# Patient Record
Sex: MALE | Race: WHITE | NOT HISPANIC OR LATINO | Employment: UNEMPLOYED | URBAN - METROPOLITAN AREA
[De-identification: names, ages, dates, MRNs, and addresses within clinical notes are randomized per-mention and may not be internally consistent; named-entity substitution may affect disease eponyms.]

---

## 2023-04-17 PROBLEM — M72.0 DUPUYTREN'S CONTRACTURE OF BOTH HANDS: Status: ACTIVE | Noted: 2023-04-17

## 2023-04-17 PROBLEM — R79.89 LOW TESTOSTERONE: Status: ACTIVE | Noted: 2023-04-17

## 2023-04-17 PROBLEM — R79.89 LOW TESTOSTERONE IN MALE: Status: ACTIVE | Noted: 2023-04-17

## 2023-04-17 PROBLEM — R71.8 ELEVATED FERRITIN, HEMOGLOBIN, AND RED BLOOD CELL COUNT (HCC): Status: ACTIVE | Noted: 2023-04-17

## 2023-04-17 PROBLEM — D58.2 ELEVATED FERRITIN, HEMOGLOBIN, AND RED BLOOD CELL COUNT (HCC): Status: ACTIVE | Noted: 2023-04-17

## 2023-04-17 PROBLEM — E78.2 MIXED HYPERLIPIDEMIA: Status: ACTIVE | Noted: 2023-04-17

## 2023-05-11 NOTE — PROGRESS NOTES
[address] [facility]  [facility]  [address] [facility] [address]  [place], [address] [facility]  [phone]  [email or web address]      NAME: Asiya Reagan  AGE: 65 y o  SEX: male  : 1960   MRN: [id number]    DATE: 2023  TIME: 8:37 AM    Assessment and Plan:    Hypogonadism: Manifested by fatigue low libido and decreased memory  We will restart this but first we will get baseline labs of testosterone free and total and a recent CBC showed a hemoglobin of 15 9  Instead of testosterone cypionate for now we will start him on daily AndroGel and then check repeat labs in 6 weeks and send him the results  We will let him know the results of the repeat labs  ED: Prescriptions refilled for sildenafil 100 mg     Reassess in the office in 3 months  Chief Complaint     Chief Complaint   Patient presents with   • Low Testosterone in Male       History of Present Illness   New patient office visit-65year-old man referred for hypogonadism/low testosterone  PSA 1   However I have no testosterone levels on file  Has been on testosterone cypionate replacement therapy 200 mg vial 0 75 cc every 16 days for couple of years in [place]  Last time he received any was 3 months ago  Last testosterone was in February 8 days after his last injection which was 693  This all started a couple of years ago when he was having afternoon fatigue where he felt wiped out and also he was experiencing some memory loss  He saw neurology and had a number of tests run and found to have a consistently low testosterone which I do not know the exact value  In any event he was placed on the testosterone replacement and this was successful  He now is experiencing the afternoon fatigue that he was having  No voiding difficulties  No family history of prostate cancer  ED: Needs sildenafil to be able to perform for penetration  This is successful at 100 mg        The following portions of the patient's history were reviewed and updated as appropriate: allergies, current medications, past family history, past medical history, past social history, past surgical history and problem list   No past medical history on file  No past surgical history on file  shoulder  Review of Systems   Review of Systems   Constitutional: Positive for fatigue  Negative for fever  Respiratory: Negative for shortness of breath  Cardiovascular: Negative for chest pain  Genitourinary: Negative  Active Problem List     Patient Active Problem List   Diagnosis   • Gastroesophageal reflux disease without esophagitis   • Other male erectile dysfunction   • Elevated ferritin, hemoglobin, and red blood cell count (HCC)   • Mixed hyperlipidemia   • Dupuytren's contracture of both hands   • Low testosterone in male       Objective   /74 (BP Location: Left arm, Patient Position: Sitting, Cuff Size: Large)   Pulse 84   Ht 6' (1 829 m)   Wt 92 1 kg (203 lb)   SpO2 100%   BMI 27 53 kg/m²     Physical Exam  Vitals reviewed  Constitutional:       Appearance: Normal appearance  He is normal weight  HENT:      Head: Normocephalic and atraumatic  Eyes:      Extraocular Movements: Extraocular movements intact  Abdominal:      General: There is no distension  Palpations: Abdomen is soft  There is no mass  Tenderness: There is no abdominal tenderness  There is no right CVA tenderness, left CVA tenderness, guarding or rebound  Hernia: No hernia is present  Genitourinary:     Penis: Normal        Testes: Normal       Prostate: Normal       Rectum: Normal       Comments: Normal circumcised phallus, testicles are normal without masses normal volume  Rectal exam feels normal tone no masses and his prostate is about 20 to 30 g, smooth symmetric and benign  No nodules  Musculoskeletal:         General: Normal range of motion  Cervical back: Normal range of motion     Skin: Coloration: Skin is not jaundiced or pale  Neurological:      General: No focal deficit present  Mental Status: He is alert and oriented to person, place, and time  Psychiatric:         Mood and Affect: Mood normal          Behavior: Behavior normal          Thought Content:  Thought content normal          Judgment: Judgment normal              Current Medications     Current Outpatient Medications:   •  B Complex-C (Vitamin B + C Complex) TABS, Take by mouth, Disp: , Rfl:   •  Coenzyme Q10 (Co Q-10 Maximum Strength) 400 MG CAPS, Take by mouth, Disp: , Rfl:   •  Omega-3 1000 MG CAPS, Take 1 capsule (1,000 mg total) by mouth 2 (two) times a day, Disp: 60 capsule, Rfl: 3  •  omeprazole (PriLOSEC) 20 mg delayed release capsule, Take 1 capsule (20 mg total) by mouth daily, Disp: 90 capsule, Rfl: 0  •  rosuvastatin (CRESTOR) 5 mg tablet, Take 1 tablet (5 mg total) by mouth daily, Disp: 30 tablet, Rfl: 1  •  sildenafil (VIAGRA) 100 mg tablet, Take 100 mg by mouth daily as needed for erectile dysfunction, Disp: , Rfl:   •  testosterone cypionate (DEPO-TESTOSTERONE) 200 mg/mL SOLN, , Disp: , Rfl:   •  omega-3-acid ethyl esters (LOVAZA) 1 g capsule, Take 2 g by mouth 2 (two) times a day, Disp: , Rfl:   •  Testosterone Cypionate 200 MG/ML SOLN, , Disp: , Rfl:         Chula Mosquera MD

## 2023-05-12 ENCOUNTER — TELEPHONE (OUTPATIENT)
Dept: OTHER | Facility: OTHER | Age: 63
End: 2023-05-12

## 2023-05-12 ENCOUNTER — CONSULT (OUTPATIENT)
Dept: UROLOGY | Facility: CLINIC | Age: 63
End: 2023-05-12

## 2023-05-12 ENCOUNTER — TELEPHONE (OUTPATIENT)
Dept: UROLOGY | Facility: CLINIC | Age: 63
End: 2023-05-12

## 2023-05-12 VITALS
OXYGEN SATURATION: 100 % | BODY MASS INDEX: 27.5 KG/M2 | WEIGHT: 203 LBS | DIASTOLIC BLOOD PRESSURE: 74 MMHG | HEART RATE: 84 BPM | HEIGHT: 72 IN | SYSTOLIC BLOOD PRESSURE: 124 MMHG

## 2023-05-12 DIAGNOSIS — N52.8 OTHER MALE ERECTILE DYSFUNCTION: ICD-10-CM

## 2023-05-12 DIAGNOSIS — R79.89 LOW TESTOSTERONE: ICD-10-CM

## 2023-05-12 RX ORDER — TESTOSTERONE GEL, 1% 10 MG/G
50 GEL TRANSDERMAL DAILY
Qty: 30 PACKET | Refills: 3 | Status: SHIPPED | OUTPATIENT
Start: 2023-05-12 | End: 2023-05-18

## 2023-05-12 RX ORDER — B-COMPLEX WITH VITAMIN C
TABLET ORAL
COMMUNITY

## 2023-05-12 RX ORDER — OMEGA-3-ACID ETHYL ESTERS 1 G/1
2 CAPSULE, LIQUID FILLED ORAL 2 TIMES DAILY
COMMUNITY
Start: 2023-02-22

## 2023-05-12 RX ORDER — UBIDECARENONE 400 MG
CAPSULE ORAL
COMMUNITY

## 2023-05-12 RX ORDER — SILDENAFIL 100 MG/1
100 TABLET, FILM COATED ORAL DAILY PRN
Qty: 10 TABLET | Refills: 11 | Status: SHIPPED | OUTPATIENT
Start: 2023-05-12

## 2023-05-12 RX ORDER — TESTOSTERONE CYPIONATE 200 MG/ML
INJECTION, SOLUTION INTRAMUSCULAR
COMMUNITY
Start: 2023-02-22 | End: 2023-05-18 | Stop reason: SDUPTHER

## 2023-05-12 NOTE — TELEPHONE ENCOUNTER
Patient needs a 3 month ov follow up with Dr Sangeeta Muñiz  Schedule not open here in [place]  Please call pt and get him set up for an appt  TIA

## 2023-05-12 NOTE — TELEPHONE ENCOUNTER
Renetta from [facility] called in stating pt's testosterone prescription will require a prior auth

## 2023-05-12 NOTE — PATIENT INSTRUCTIONS
Have the blood work done, then start the AndroGel  Have the blood work done in the morning  After being on the AndroGel for 6 weeks, on or about June 23 to have repeat blood work done in the morning  I will let you know those results  If AndroGel does not work out I will be happy to switch you back to testosterone cypionate-I usually use 100 mg weekly

## 2023-05-16 ENCOUNTER — PATIENT MESSAGE (OUTPATIENT)
Dept: UROLOGY | Facility: CLINIC | Age: 63
End: 2023-05-16

## 2023-05-16 DIAGNOSIS — R79.89 LOW TESTOSTERONE: Primary | ICD-10-CM

## 2023-05-17 ENCOUNTER — APPOINTMENT (OUTPATIENT)
Dept: LAB | Facility: CLINIC | Age: 63
End: 2023-05-17

## 2023-05-17 DIAGNOSIS — R79.89 LOW TESTOSTERONE: ICD-10-CM

## 2023-05-17 LAB
ERYTHROCYTE [DISTWIDTH] IN BLOOD BY AUTOMATED COUNT: 14.5 % (ref 11.6–15.1)
ESTRADIOL SERPL-MCNC: 28.7 PG/ML
HCT VFR BLD AUTO: 48.5 % (ref 36.5–49.3)
HGB BLD-MCNC: 15.9 G/DL (ref 12–17)
MCH RBC QN AUTO: 28.3 PG (ref 26.8–34.3)
MCHC RBC AUTO-ENTMCNC: 32.8 G/DL (ref 31.4–37.4)
MCV RBC AUTO: 87 FL (ref 82–98)
PLATELET # BLD AUTO: 223 THOUSANDS/UL (ref 149–390)
PMV BLD AUTO: 10 FL (ref 8.9–12.7)
RBC # BLD AUTO: 5.61 MILLION/UL (ref 3.88–5.62)
WBC # BLD AUTO: 4.11 THOUSAND/UL (ref 4.31–10.16)

## 2023-05-18 LAB
TESTOST FREE SERPL-MCNC: 8.1 PG/ML (ref 6.6–18.1)
TESTOST SERPL-MCNC: 458 NG/DL (ref 264–916)

## 2023-05-18 RX ORDER — TESTOSTERONE CYPIONATE 200 MG/ML
100 INJECTION, SOLUTION INTRAMUSCULAR WEEKLY
Qty: 10 ML | Refills: 3 | Status: SHIPPED | OUTPATIENT
Start: 2023-05-18

## 2023-05-18 NOTE — TELEPHONE ENCOUNTER
Daria Cohen from [address] [place] [address] called in stating pt's prescription for testosterone cypionate (DEPO-TESTOSTERONE) 200 mg/mL SOLN [[id number]] requires a prior auth

## 2023-05-19 ENCOUNTER — PATIENT MESSAGE (OUTPATIENT)
Dept: UROLOGY | Facility: CLINIC | Age: 63
End: 2023-05-19

## 2023-05-19 ENCOUNTER — OFFICE VISIT (OUTPATIENT)
Dept: GASTROENTEROLOGY | Facility: CLINIC | Age: 63
End: 2023-05-19

## 2023-05-19 VITALS
OXYGEN SATURATION: 98 % | SYSTOLIC BLOOD PRESSURE: 120 MMHG | HEART RATE: 64 BPM | DIASTOLIC BLOOD PRESSURE: 80 MMHG | HEIGHT: 72 IN | WEIGHT: 209 LBS | BODY MASS INDEX: 28.31 KG/M2

## 2023-05-19 DIAGNOSIS — Z14.8 HEMOCHROMATOSIS CARRIER: ICD-10-CM

## 2023-05-19 DIAGNOSIS — R79.89 LOW TESTOSTERONE IN MALE: Primary | ICD-10-CM

## 2023-05-19 NOTE — LETTER
May 19, 2023     Micaela Espana LINDSAYRA  [address] [address]    Patient: Acacia Wilson   YOB: 1960   Date of Visit: 5/19/2023       Dear Dr Joanna Farrell: Thank you for referring Acacia Wilson to me for evaluation  Below are my notes for this consultation  If you have questions, please do not hesitate to call me  I look forward to following your patient along with you  Sincerely,        Aden Peguero MD        CC: No Recipients  Aden Peguero MD  5/19/2023  9:58 AM  Incomplete  [facility]    Dear Pérez Brantley,    I had the pleasure of seeing your patient Acacia Wilson in the office today and I thank you for this kind referral        Chief Complaint: Possible hemochromatosis      HPI:  Acacia Wilson is a 59 y o  male who presents with a history of possible hemochromatosis  According to the patient some years ago while in [place] he went for a blood test and was found to have elevated hemoglobin and hematocrit  According the patient he never underwent any other testing such as ferritin or any contrast testing such as MRI or CT  He never had a liver biopsy  He was told that his counts were too high and to go for regular blood donation  He has been going every 56 days  However the patient also has been taking testosterone for quite some time  He underwent a hereditary hemochromatosis genetic study on April 20 and was found to be heterozygous for H63D  His last serum ferritin was 15  His liver functions done at that time were normal   He is up-to-date with his colonoscopy  He has never had any known liver disease  As stated he has been taking testosterone for quite some time  He does not smoke  He does not live at elevated altitudes  His most recent blood work has his hemoglobin and hematocrit within normal range  There is no family history of liver disease         Review of Systems:   Constitutional: No fever or chills, feels well, no tiredness, no recent weight gain or weight loss  HENT: No complaints of earache, no hearing loss, no nosebleeds, no nasal discharge, no sore throat, no hoarseness  Eyes: No complaints of eye pain, no red eyes, no discharge from eyes, no itchy eyes  Cardiovascular: No complaints of slow heart rate, no fast heart rate, no chest pain, no palpitations, no leg claudication, no lower extremity edema  Respiratory: No complaints of shortness of breath, no wheezing, no cough, no SOB on exertion, no orthopnea  Gastrointestinal: As noted in HPI  Genitourinary: No complaints of dysuria, no incontinence, no hesitancy, no nocturia  Musculoskeletal: No complaints of arthralgia, no myalgias, no joint swelling or stiffness, no limb pain or swelling  Neurological: No complaints of headache, no confusion, no convulsions, no numbness or tingling, no dizziness or fainting, no limb weakness, no difficulty walking  Skin: No complaints of skin rash or skin lesions, no itching, no skin wound, no dry skin  Hematological/Lymphatic: No complaints of swollen glands, does not bleed easy  Allergic/Immunologic: No immunocompromised state  Endocrine:  No complaints of polyuria, no polydipsia  Psychiatric/Behavioral: is not suicidal, no sleep disturbances, no anxiety or depression, no change in personality, no emotional problems         Historical Information   Past Medical History:   Diagnosis Date   • GERD (gastroesophageal reflux disease)    • Hyperlipidemia      Past Surgical History:   Procedure Laterality Date   • TONSILLECTOMY       Social History   Social History     Substance and Sexual Activity   Alcohol Use Yes   • Alcohol/week: 1 0 standard drink   • Types: 1 Cans of beer per week    Comment: Occasional     Social History     Substance and Sexual Activity   Drug Use Never     Social History     Tobacco Use   Smoking Status Never   Smokeless Tobacco Never     Family History   Problem Relation Age of Onset   • Heart disease Mother          Current Medications: has a current medication list which includes the following prescription(s): vitamin b + c complex, co q-10 maximum strength, omega-3, omega-3-acid ethyl esters, omeprazole, rosuvastatin, sildenafil, and testosterone cypionate  Vital Signs: /80   Pulse 64   Ht 6' (1 829 m)   Wt 94 8 kg (209 lb)   SpO2 98%   BMI 28 35 kg/m²     Physical Exam:   Constitutional  General Appearance: No acute distress, well appearing and well nourished  Head  Normocephalic  Eyes  Conjunctivae and lids: No swelling, erythema, or discharge  Pupils and irises: Equal, round and reactive to light  Ears, Nose, Mouth, and Throat  External inspection of ears and nose: Normal  Nasal mucosa, septum and turbinates: Normal without edema or erythema/   Oropharynx: Normal with no erythema, edema, exudate or lesions  Neck  Normal range of motion  Neck supple  Cardiovascular  Auscultation of the heart: Normal rate and rhythm, normal S1 and S2 without murmurs  Examination of the extremities for edema and/or varicosities: Normal  Pulmonary/Chest  Respiratory effort: No increased work of breathing or signs of respiratory distress  Auscultation of lungs: Clear to auscultation, equal breath sounds bilaterally, no wheezes, rales, no rhonchi  Abdomen  Abdomen: Non-tender, no masses  Liver and spleen: No hepatomegaly or splenomegaly  Musculoskeletal  Gait and station: normal   Digits and Nails: normal without clubbing or cyanosis  Inspection/palpation of joints, bones, and muscles: Normal  Neurological  No nystagmus or asterixis  Skin  Skin and subcutaneous tissue: Normal without rashes or lesions  Lymphatic  Palpation of the lymph nodes in neck: No lymphadenopathy     Psychiatric  Orientation to person, place and time: Normal   Mood and affect: Normal          Labs:   Lab Results   Component Value Date    ALT 16 04/20/2023    AST 16 04/20/2023    BUN 20 04/20/2023 CALCIUM 9 1 04/20/2023     04/20/2023    CO2 26 04/20/2023    CREATININE 1 30 04/20/2023    HCT 48 5 05/17/2023    HGB 15 9 05/17/2023     05/17/2023    K 4 5 04/20/2023    PSA 1 6 04/20/2023    WBC 4 11 (L) 05/17/2023         X-Rays & Procedures:   No orders to display         ______________________________________________________________________      Assessment & Plan:      Diagnoses and all orders for this visit:    Hemochromatosis carrier  -     Ambulatory Referral to Gastroenterology      I see no compelling evidence for hemochromatosis  Heterozygous H63D is not pathognomonic for that disease  Homozygous C2 80 Y would be  I believe he just has elevated hemoglobin and hematocrit likely based on his use of testosterone which on average will raise hemoglobin 1 1 g and hematocrit 4 4  I would just recommend following his liver functions on a yearly basis  I would like to thank you for allowing me to participate in his care                With warmest regards,    Claudine Tello MD, [facility]

## 2023-05-19 NOTE — PROGRESS NOTES
[facility]    Dear Hua Cowan,    I had the pleasure of seeing your patient Artemio Rosales in the office today and I thank you for this kind referral        Chief Complaint: Possible hemochromatosis      HPI:  Artemio Rosales is a 59 y o  male who presents with a history of possible hemochromatosis  According to the patient some years ago while in [place] he went for a blood test and was found to have elevated hemoglobin and hematocrit  According the patient he never underwent any other testing such as ferritin or any contrast testing such as MRI or CT  He never had a liver biopsy  He was told that his counts were too high and to go for regular blood donation  He has been going every 56 days  However the patient also has been taking testosterone for quite some time  He underwent a hereditary hemochromatosis genetic study on April 20 and was found to be heterozygous for H63D  His last serum ferritin was 15  His liver functions done at that time were normal   He is up-to-date with his colonoscopy  He has never had any known liver disease  As stated he has been taking testosterone for quite some time  He does not smoke  He does not live at elevated altitudes  His most recent blood work has his hemoglobin and hematocrit within normal range  There is no family history of liver disease         Review of Systems:   Constitutional: No fever or chills, feels well, no tiredness, no recent weight gain or weight loss  HENT: No complaints of earache, no hearing loss, no nosebleeds, no nasal discharge, no sore throat, no hoarseness  Eyes: No complaints of eye pain, no red eyes, no discharge from eyes, no itchy eyes  Cardiovascular: No complaints of slow heart rate, no fast heart rate, no chest pain, no palpitations, no leg claudication, no lower extremity edema  Respiratory: No complaints of shortness of breath, no wheezing, no cough, no SOB on exertion, no orthopnea     Gastrointestinal: As noted in HPI  Genitourinary: No complaints of dysuria, no incontinence, no hesitancy, no nocturia  Musculoskeletal: No complaints of arthralgia, no myalgias, no joint swelling or stiffness, no limb pain or swelling  Neurological: No complaints of headache, no confusion, no convulsions, no numbness or tingling, no dizziness or fainting, no limb weakness, no difficulty walking  Skin: No complaints of skin rash or skin lesions, no itching, no skin wound, no dry skin  Hematological/Lymphatic: No complaints of swollen glands, does not bleed easy  Allergic/Immunologic: No immunocompromised state  Endocrine:  No complaints of polyuria, no polydipsia  Psychiatric/Behavioral: is not suicidal, no sleep disturbances, no anxiety or depression, no change in personality, no emotional problems  Historical Information   Past Medical History:   Diagnosis Date   • GERD (gastroesophageal reflux disease)    • Hyperlipidemia      Past Surgical History:   Procedure Laterality Date   • TONSILLECTOMY       Social History   Social History     Substance and Sexual Activity   Alcohol Use Yes   • Alcohol/week: 1 0 standard drink   • Types: 1 Cans of beer per week    Comment: Occasional     Social History     Substance and Sexual Activity   Drug Use Never     Social History     Tobacco Use   Smoking Status Never   Smokeless Tobacco Never     Family History   Problem Relation Age of Onset   • Heart disease Mother          Current Medications: has a current medication list which includes the following prescription(s): vitamin b + c complex, co q-10 maximum strength, omega-3, omega-3-acid ethyl esters, omeprazole, rosuvastatin, sildenafil, and testosterone cypionate         Vital Signs: /80   Pulse 64   Ht 6' (1 829 m)   Wt 94 8 kg (209 lb)   SpO2 98%   BMI 28 35 kg/m²     Physical Exam:   Constitutional  General Appearance: No acute distress, well appearing and well nourished  Head  Normocephalic  Eyes  Conjunctivae and lids: No swelling, erythema, or discharge  Pupils and irises: Equal, round and reactive to light  Ears, Nose, Mouth, and Throat  External inspection of ears and nose: Normal  Nasal mucosa, septum and turbinates: Normal without edema or erythema/   Oropharynx: Normal with no erythema, edema, exudate or lesions  Neck  Normal range of motion  Neck supple  Cardiovascular  Auscultation of the heart: Normal rate and rhythm, normal S1 and S2 without murmurs  Examination of the extremities for edema and/or varicosities: Normal  Pulmonary/Chest  Respiratory effort: No increased work of breathing or signs of respiratory distress  Auscultation of lungs: Clear to auscultation, equal breath sounds bilaterally, no wheezes, rales, no rhonchi  Abdomen  Abdomen: Non-tender, no masses  Liver and spleen: No hepatomegaly or splenomegaly  Musculoskeletal  Gait and station: normal   Digits and Nails: normal without clubbing or cyanosis  Inspection/palpation of joints, bones, and muscles: Normal  Neurological  No nystagmus or asterixis  Skin  Skin and subcutaneous tissue: Normal without rashes or lesions  Lymphatic  Palpation of the lymph nodes in neck: No lymphadenopathy     Psychiatric  Orientation to person, place and time: Normal   Mood and affect: Normal          Labs:   Lab Results   Component Value Date    ALT 16 04/20/2023    AST 16 04/20/2023    BUN 20 04/20/2023    CALCIUM 9 1 04/20/2023     04/20/2023    CO2 26 04/20/2023    CREATININE 1 30 04/20/2023    HCT 48 5 05/17/2023    HGB 15 9 05/17/2023     05/17/2023    K 4 5 04/20/2023    PSA 1 6 04/20/2023    WBC 4 11 (L) 05/17/2023         X-Rays & Procedures:   No orders to display         ______________________________________________________________________      Assessment & Plan:      Diagnoses and all orders for this visit:    Hemochromatosis carrier  -     Ambulatory Referral to Gastroenterology      I see no compelling evidence for hemochromatosis  Heterozygous H63D is not pathognomonic for that disease  Homozygous C2 80 Y would be  I believe he just has elevated hemoglobin and hematocrit likely based on his use of testosterone which on average will raise hemoglobin 1 1 g and hematocrit 4 4  I would just recommend following his liver functions on a yearly basis  I would like to thank you for allowing me to participate in his care                With warmest regards,    Nelia Marti MD, [facility]

## 2023-05-19 NOTE — TELEPHONE ENCOUNTER
Patient calling to check on status of prior auth for testosterone cypionate    Patient requesting a call back at [phone]

## 2023-06-06 ENCOUNTER — TELEPHONE (OUTPATIENT)
Dept: OTHER | Facility: OTHER | Age: 63
End: 2023-06-06

## 2023-06-06 DIAGNOSIS — R79.89 LOW TESTOSTERONE IN MALE: Primary | ICD-10-CM

## 2023-06-06 NOTE — TELEPHONE ENCOUNTER
Patient is calling to set a follow up after starting testosterone, Wednesdays preferred, and needs to know if a pre-auth was completed for it    He is also supposed to have blood work completed by June 23rd and needs orders

## 2023-06-06 NOTE — TELEPHONE ENCOUNTER
Called and spoke with patient  Placed orders for labwork to be completed 6 weeks after he started the injections  Informed that Dr Lydia Ybarra schedule is not open yet for Mondays in [place]  Advised I will postpone to follow up  He also wanted to know if testosterone and the syringes were authorized yet  He said he last purchased them out of pocket so he didn't have to wait   Informed I will send high priority to auth specialist

## 2023-06-16 NOTE — TELEPHONE ENCOUNTER
Called and spoke with patient  According to last office visit note from May, patient is to follow up in 3 months  Patient has September follow up, asked if he wanted to move appt up  Patient states his wife is having a surgery in August so he wishes to keep appt as is  Informed that messages were sent to medication auth specialist and will reach out again next week if we don't hear back

## 2023-06-18 DIAGNOSIS — E78.2 MIXED HYPERLIPIDEMIA: ICD-10-CM

## 2023-06-19 ENCOUNTER — OFFICE VISIT (OUTPATIENT)
Dept: OBGYN CLINIC | Facility: MEDICAL CENTER | Age: 63
End: 2023-06-19
Payer: COMMERCIAL

## 2023-06-19 VITALS
DIASTOLIC BLOOD PRESSURE: 86 MMHG | HEIGHT: 72 IN | WEIGHT: 209 LBS | HEART RATE: 84 BPM | BODY MASS INDEX: 28.31 KG/M2 | SYSTOLIC BLOOD PRESSURE: 122 MMHG

## 2023-06-19 DIAGNOSIS — M72.0 DUPUYTREN'S CONTRACTURE OF BOTH HANDS: ICD-10-CM

## 2023-06-19 PROCEDURE — 99202 OFFICE O/P NEW SF 15 MIN: CPT | Performed by: SURGERY

## 2023-06-19 RX ORDER — ROSUVASTATIN CALCIUM 5 MG/1
5 TABLET, COATED ORAL DAILY
Qty: 90 TABLET | Refills: 0 | Status: SHIPPED | OUTPATIENT
Start: 2023-06-19

## 2023-06-19 NOTE — PROGRESS NOTES
ORTHOPAEDIC HAND, WRIST, AND ELBOW OFFICE  VISIT       ASSESSMENT/PLAN:      59 y o male who presents with Dupuytren's disease of B/L hands L>R    Physical exam preformed  We discussed when surgery is warranted when there is a contracture of his digits  His symptoms may not worsen for some time  Pt will monitor symptoms  The patient verbalized understanding of exam findings and treatment plan  We engaged in the shared decision-making process and treatment options were discussed at length with the patient  Surgical and conservative management discussed today along with risks and benefits  Diagnoses and all orders for this visit:    Dupuytren's contracture of both hands  -     Ambulatory Referral to Orthopedic Surgery        Follow Up:  Return if symptoms worsen or fail to improve  General Discussions:  Dupuytren's Disease: The anatomy and physiology of Dupuytren's disease were discussed with the patient today in the office  Increased collagen formation (similar to scar tissue formation but without injury) within the interval between the skin volarly and the flexor tendons dorsally can result in pit formation, nodular formation, or eventual cord formation  These pathologic cords can cause contracture at either the metacarpophalangeal joint, proximal interphalangeal joint, or both  As the cords progress towards the proximal interphalangeal joint, the neurovascular structures of the finger may become involved within the disease process  While this is a genetic condition with variable penetrance, repetitive micro trauma may trigger the development of cord formation and thus contracture  Conservative treatment options including therapy to maintain joint mobility and tabletop testing were discussed  Other treatments include Xiaflex (collagenase) injection and surgical excision of abnormal cords  "          ____________________________________________________________________________________________________________________________________________      CHIEF COMPLAINT:  Chief Complaint   Patient presents with   • Left Hand - Pain   • Right Hand - Pain       SUBJECTIVE:  Claudia Wilder is a 59y o  year old  male who presents for evaluation of B/L contractures   Pt states he has has skin contracture and masses in his Left hand for approx 30-40 yrs now  He has just noticed these are starting in his right hand  He denies pain and they do not affect his function  He was his PCP who referred him to have it looked at  No past treatments  No injury or traumas      I have personally reviewed all the relevant PMH, PSH, SH, FH, Medications and allergies      PAST MEDICAL HISTORY:  Past Medical History:   Diagnosis Date   • GERD (gastroesophageal reflux disease)    • Hyperlipidemia        PAST SURGICAL HISTORY:  Past Surgical History:   Procedure Laterality Date   • TONSILLECTOMY         FAMILY HISTORY:  Family History   Problem Relation Age of Onset   • Heart disease Mother        SOCIAL HISTORY:  Social History     Tobacco Use   • Smoking status: Never   • Smokeless tobacco: Never   Vaping Use   • Vaping Use: Never used   Substance Use Topics   • Alcohol use:  Yes     Alcohol/week: 1 0 standard drink of alcohol     Types: 1 Cans of beer per week     Comment: Occasional   • Drug use: Never       MEDICATIONS:    Current Outpatient Medications:   •  B Complex-C (Vitamin B + C Complex) TABS, Take by mouth, Disp: , Rfl:   •  Coenzyme Q10 (Co Q-10 Maximum Strength) 400 MG CAPS, Take by mouth, Disp: , Rfl:   •  NEEDLE, DISP, 18 G 18G X 1-1/2\" MISC, Use once a week, Disp: 10 each, Rfl: 6  •  Needles & Syringes MISC, Use once a week, Disp: 10 each, Rfl: 6  •  Omega-3 1000 MG CAPS, Take 1 capsule (1,000 mg total) by mouth 2 (two) times a day, Disp: 60 capsule, Rfl: 3  •  omega-3-acid ethyl esters (LOVAZA) 1 g capsule, Take 2 g by " "mouth 2 (two) times a day, Disp: , Rfl:   •  omeprazole (PriLOSEC) 20 mg delayed release capsule, Take 1 capsule (20 mg total) by mouth daily, Disp: 90 capsule, Rfl: 0  •  rosuvastatin (CRESTOR) 5 mg tablet, Take 1 tablet (5 mg total) by mouth daily, Disp: 90 tablet, Rfl: 0  •  sildenafil (VIAGRA) 100 mg tablet, Take 1 tablet (100 mg total) by mouth daily as needed for erectile dysfunction, Disp: 10 tablet, Rfl: 11  •  SYRINGE-NEEDLE, DISP, 3 ML (Luer Lock Safety Syringes) 22G X 1\" 3 ML MISC, Use once a week, Disp: 10 each, Rfl: 6  •  testosterone cypionate (DEPO-TESTOSTERONE) 200 mg/mL SOLN, Inject 0 5 mL (100 mg total) into a muscle once a week, Disp: 10 mL, Rfl: 3    ALLERGIES:  No Known Allergies        REVIEW OF SYSTEMS:  Review of Systems   Constitutional: Negative for chills and fever  HENT: Negative for ear pain and sore throat  Eyes: Negative for pain and visual disturbance  Respiratory: Negative for cough and shortness of breath  Cardiovascular: Negative for chest pain and palpitations  Gastrointestinal: Negative for abdominal pain and vomiting  Genitourinary: Negative for dysuria and hematuria  Musculoskeletal: Negative for arthralgias and back pain  Skin: Negative for color change and rash  Neurological: Negative for seizures and syncope  All other systems reviewed and are negative        VITALS:  Vitals:    06/19/23 1400   BP: 122/86   Pulse: 84       LABS:  HgA1c: No results found for: \"HGBA1C\"  BMP:   Lab Results   Component Value Date    CALCIUM 9 1 04/20/2023    K 4 5 04/20/2023    CO2 26 04/20/2023     04/20/2023    BUN 20 04/20/2023    CREATININE 1 30 04/20/2023       _____________________________________________________  PHYSICAL EXAMINATION:  General: well developed and well nourished, alert, oriented times 3 and appears comfortable  Psychiatric: Normal  HEENT: Normocephalic, Atraumatic Trachea Midline, No torticollis  Pulmonary: No audible wheezing or respiratory " distress   Abdomen/GI: Non tender, non distended   Cardiovascular: No pitting edema, 2+ radial pulse   Skin: No masses, erythema, lacerations, fluctation, ulcerations  Neurovascular: Sensation Intact to the Median, Ulnar, Radial Nerve, Motor Intact to the Median, Ulnar, Radial Nerve and Pulses Intact  Musculoskeletal: Normal, except as noted in detailed exam and in HPI        MUSCULOSKELETAL EXAMINATION:  SILT  Composite fist    Cord contracture noted:   Left palmar ring and small fingers    Right Ring finger  ___________________________________________________  STUDIES REVIEWED:  No images noted           PROCEDURES PERFORMED:  Procedures  No Procedures performed today    _____________________________________________________      Anjali Angelo    I,:  Jeremías Dailey am acting as a scribe while in the presence of the attending physician :       I,:  Joanna Ruiz MD personally performed the services described in this documentation    as scribed in my presence :

## 2023-06-21 ENCOUNTER — CONSULT (OUTPATIENT)
Dept: PULMONOLOGY | Facility: CLINIC | Age: 63
End: 2023-06-21
Payer: COMMERCIAL

## 2023-06-21 VITALS
DIASTOLIC BLOOD PRESSURE: 82 MMHG | WEIGHT: 209 LBS | BODY MASS INDEX: 28.35 KG/M2 | OXYGEN SATURATION: 99 % | HEART RATE: 91 BPM | SYSTOLIC BLOOD PRESSURE: 122 MMHG | TEMPERATURE: 95.7 F

## 2023-06-21 DIAGNOSIS — Z76.89 ESTABLISHING CARE WITH NEW DOCTOR, ENCOUNTER FOR: ICD-10-CM

## 2023-06-21 DIAGNOSIS — G47.33 OBSTRUCTIVE SLEEP APNEA: Primary | ICD-10-CM

## 2023-06-21 PROCEDURE — 99203 OFFICE O/P NEW LOW 30 MIN: CPT | Performed by: INTERNAL MEDICINE

## 2023-06-21 NOTE — PROGRESS NOTES
Sleep Consultation   Benny Sevilla 59 y o  male MRN: [id number]    Reason for consultation:   Establishing care with new pulmonary porivder    Requesting physician:   TESSIE Yen (2023)    Assessment/Plan  1  Establishing care with new doctor, encounter for ADY    2  Obstructive sleep apnea  Assessment & Plan:  Assessment:  Patient is a very pleasant 45-year-old male that presents to the sleep medicine clinic for establishing of care in regards to obstructive sleep apnea  The patient recently moved from [place] to [place] and has had trouble with his own CPAP where mold was an issue and DME replacements unsuccessful  The patient has been using his now  brother-in-law's CPAP machine and presented for adjustment of settings  In short the patient was diagnosed with mild to moderate obstructive sleep apnea in 2019 via a home study  AHI 6, desaturation 88% for 3 minutes  Patient's current settings are AutoPap, max 10 cmH2O, minimum 6 cm of water  Patient otherwise doing well  The patient will be sent for a mask refitting, and DME equipment reordering establishment  Vitamin B12, MMA, and folate will be ordered with baseline labs  Plan:  - Case discussed with attending physician Dr Laz Leija  - CPAP settings adjusted in office as noted below  - Mask fitting, DME reorder orders placed  - Vitamin B12, B9, MMA ordered as baseline labs  - Patient to call office for any new symptoms, questions or concerns  - Patient to follow-up with Dr Laz Leija in 1 year  Orders:  -     Mask fitting only; Future  -     PAP DME Resupply/Reorder  -     Vitamin B12/Folate, Serum Panel; Future  -     Methylmalonic acid, serum; Future      History of Present Illness   Patient is a pleasant 45-year-old male that presents to the sleep medicine clinic to establish care for struct of sleep apnea after moving to [place] from [place]    The patient reports that he was diagnosed with obstructive sleep apnea in "2019, the patient reports that he was diagnosed with mild to moderate obstructive sleep apnea, and reports having completed a home sleep study  AHI 6, desaturation 88% of 3 minutes, patient was started on CPAP with 6 mm of water  Per brief history from previous provider the patient showed to be mobile chart: Initial consultation: The patient reported forgetfulness for a few years, recently seen by neurology, unremarkable MRI of the head, blood tests was unremarkable, wife with called a history of loud snoring for multiple years, generalized twitching at night, no obvious seizure activity  The patient goes to bed at midnight, wakes up at 0700, no alcohol, or excessive caffeine intake, wakes up multiple times per night, because the patient was taking of his quadriplegic brother-in-law, toilets of 1-2 times per night, denies morning or daytime somnolence  Patient had signs of daytime fatigue, no previous MVA or drowsy driving  The patient is retired, no family history of obstructive sleep apnea, will weight is stable  Works out 4 times per week  Patient has less testosterone levels, started replacement therapy  On a follow-up visitation it was reported the patient is doing well on his CPAP, and was compliant  On visitation today, 2023 the following was reported: The patient noted that he was sent a replacement CPAP in around  to 2022 for recall because of black mold that was in the machine  The patient was given a new motor and after placement the patient reported 2 weeks later that there was black deposits at the neck where the hose was attached to the machine  The patient called his DME, the patient placed a request to get a new reserve tank  The patient reports that he was subsequently unwilling to use the CPAP because of the \"black stuff\"  The patient reports that in 2022, and then in 2023 that he started using his  brother-in-law CPAP    The patient " "reports that there has been difficulty with the machine where there is a strong \"blowing sensation \"where the air will be escaping from the tubes that are attached to the mask where it is uncomfortable for the patient to use  The patient reports that he had a [employer] dream station in the past   The patient typically uses a nose pillow mask  CURRENT CPAP MACHINE DETAILS:  MODEL: Resmed SAS  AirSense 10 [Autoset]  SN: [id number]  RED: [id number]  LOT: [id number]  [id number]  BEN 1  DN: [id number]  [facility] ID: [id number]    The patients current setting are:  - AUTO PAP  - MAX PRESSURE 10 cm H2O  - MIN PRESSURE 6cm H2O    Patient reports going to bed at 2130, wakes up at 0600, feels rested  Patient awakens around 1 times per night, the machine is the source of this, reports that he simply needs to adjust the machine  The patient does not snore  The patient denies a bloody nose, congestion, GERD when using the CPAP, and reports occasional dry mouth  The patient reports around 112 ounce cup of coffee per day  The patient reports having smoked around 6 months, quarter pack per day at the age of 26 whilst in the [employer]  The patient reports 145 beers per month, no hard liquor  The patient denies any recreational substances  The patient states that his stress and anxiety is currently well controlled  The patient feels safe at home, denies any HI SI  The patient lives with his wife, and is single floor home  The patient does not report any problems with his ADLs  The bed partner reports shaking movements, patient unaware of them, it is reported that the whole body will twitch, frequency unknown, this is chronic and was reported before the patient started CPAP  The patient reports no seizures, or family history of seizures  The patient reports no heart attack, and reports his mother had heart attack in her 62s to 76s, he is unsure exactly when    The patient reports no history of meningitis, and reports no family history " of central nervous infection  The patient denies any history of concussion or MVA  The patient denies any CNS instrumentation  The patient denies history of stroke, or family history of stroke  The patient denies history of brain aneurysm, or family history of aneurysm  The patient denies any brain cancer, or family history of brain cancer  The patient denies any developmental abnormality  The patient denies any movement disorders in self or family  The patient reports that his birth father had multiple sclerosis  The patient reports that there is a history of dementia in his family, and reports that he thinks that he has something because of his memory loss  The patient reports that since starting the CPAP that his memory has greatly improved    Other Scores:  ESS: 2/24  Neck Circumference: 17 inches  Mallampati: 4    Previous Pulmonary Provider:  SAMMY Youssef , and [address] [facility] JUDY  [facility]  [facility] [address], [place], [address]    Review of Systems  The patient denies any headache, chest pain, shortness of breath, abdominal pain, nausea, vomiting, diarrhea, constipation, changes in sensorium, weakness, paresthesias, sleep paralysis, sleepwalking, bowel bladder incontinence, gait or ambulatory trouble, any DME for ambulation  The patient reports that he used his CPAP last night, denies falls, trips, loss of consciousness, or head trauma      Historical Information   Past Medical History:   Diagnosis Date   • GERD (gastroesophageal reflux disease)    • Hyperlipidemia      Past Surgical History:   Procedure Laterality Date   • TONSILLECTOMY       Family History   Problem Relation Age of Onset   • Heart disease Mother      Social History     Socioeconomic History   • Marital status: /Civil Union     Spouse name: Not on file   • Number of children: Not on file   • Years of education: Not on file   • Highest education "level: Not on file   Occupational History   • Not on file   Tobacco Use   • Smoking status: Never   • Smokeless tobacco: Never   Vaping Use   • Vaping Use: Never used   Substance and Sexual Activity   • Alcohol use: Yes     Alcohol/week: 1 0 standard drink of alcohol     Types: 1 Cans of beer per week     Comment: Occasional   • Drug use: Never   • Sexual activity: Yes     Partners: Female   Other Topics Concern   • Not on file   Social History Narrative   • Not on file     Social Determinants of Health     Financial Resource Strain: Not on file   Food Insecurity: Not on file   Transportation Needs: Not on file   Physical Activity: Not on file   Stress: Not on file   Social Connections: Not on file   Intimate Partner Violence: Not on file   Housing Stability: Not on file     Meds/Allergies   No Known Allergies    Home medications:  Prior to Admission medications    Medication Sig Start Date End Date Taking?  Authorizing Provider   B Complex-C (Vitamin B + C Complex) TABS Take by mouth   Yes Historical Provider, MD   Coenzyme Q10 (Co Q-10 Maximum Strength) 400 MG CAPS Take by mouth   Yes Historical Provider, MD   NEEDLE, DISP, 18 G 18G X 1-1/2\" MISC Use once a week 5/19/23  Yes TESSIE Grant   Needles & Syringes MISC Use once a week 5/19/23  Yes TESSIE Sheppard   Omega-3 1000 MG CAPS Take 1 capsule (1,000 mg total) by mouth 2 (two) times a day 4/25/23  Yes TESSIE Lazcano   omega-3-acid ethyl esters (LOVAZA) 1 g capsule Take 2 g by mouth 2 (two) times a day 2/22/23  Yes Historical Provider, MD   omeprazole (PriLOSEC) 20 mg delayed release capsule Take 1 capsule (20 mg total) by mouth daily 4/25/23  Yes TESSIE Lazcano   rosuvastatin (CRESTOR) 5 mg tablet Take 1 tablet (5 mg total) by mouth daily 6/19/23  Yes TESSIE Lazcano   sildenafil (VIAGRA) 100 mg tablet Take 1 tablet (100 mg total) by mouth daily as needed for erectile dysfunction 5/12/23  Yes Unknown MD Kristy   SYRINGE-NEEDLE, DISP, 3 ML " "(Luer Lock Safety Syringes) 22G X 1\" 3 ML MISC Use once a week 5/19/23  Yes TESSIE Sheppard   testosterone cypionate (DEPO-TESTOSTERONE) 200 mg/mL SOLN Inject 0 5 mL (100 mg total) into a muscle once a week 5/18/23  Yes TESSIE Sheppard     Vitals:   Blood pressure 122/82, pulse 91, temperature (!) 95 7 °F (35 4 °C), temperature source Tympanic, weight 94 8 kg (209 lb), SpO2 99 %  , Body mass index is 28 35 kg/m²  Neck Circumference: 17    Physical Exam  General: Awake alert and oriented x 3, conversant without conversational dyspnea, NAD, normal affect  HEENT:   Sclera noninjected, nonicteric OU, Nares patent,  no craniofacial abnormalities, Mucous membranes, moist, no oral lesions, normal dentition, Mallampati class 4  NECK: Trachea midline, no accessory muscle use, no stridor,  JVP not elevated  CARDIAC: Reg, single s1/S2, no m/r/g  PULM: CTA bilaterally no wheezing, rhonchi or rales  ABD: Soft nontender, nondistended, no rebound, no rigidity, no guarding  EXT: No cyanosis, no clubbing, no edema, normal capillary refill  NEURO: no focal neurologic deficits, AAOx3, moving all extremities appropriately    Labs: I have personally reviewed pertinent lab results       Lab Results   Component Value Date    WBC 4 11 (L) 05/17/2023    HGB 15 9 05/17/2023    HCT 48 5 05/17/2023    MCV 87 05/17/2023     05/17/2023      Lab Results   Component Value Date    CALCIUM 9 1 04/20/2023    K 4 5 04/20/2023    CO2 26 04/20/2023     04/20/2023    BUN 20 04/20/2023    CREATININE 1 30 04/20/2023     Lab Results   Component Value Date    IRON 101 04/20/2023    TIBC 400 04/20/2023    FERRITIN 15 (L) 04/20/2023     No results found for: \"PHURAQBQ24\"  No results found for: \"FOLATE\"    Sleep studies:   Diagnostic: Yes, at home  Titration: N/A  Split: N/A    Compliance Data:  Type of CPAP: AUTO PAP 6-10                                   Percent usage: 28/30 days                                   Average time used: 7 1 " "hours                                  Time in large leak: 35L/min                                   Residual AHI: 2 0  ---------------  REBECCA Moody ,   Neurology Residency, PGY-II    Please see attending's attestation for critical care time provided and any billing information  Portions of the record may have been created with voice recognition software  Occasional wrong word or \"sound a like\" substitutions may have occurred due to the inherent limitations of voice recognition software  Read the chart carefully and recognize, using context, where substitutions have occurred    "

## 2023-06-21 NOTE — ASSESSMENT & PLAN NOTE
The patient recently moved from [place] to [place] and has had trouble with his own CPAP where mold was an issue and DME replacements unsuccessful  The patient has been using his now  brother-in-law's ResMed Airsense 8 and presented for adjustment of settings  2019 via a home study  AHI 6, desaturation 88% for 3 minutes  The patient's machine is titrated to 12 cm H2O, which is likely his brother-in-law's old settings  I reviewed his prior study which showed that 6 cm H2O had a residual AHI 5 4  Therefore I manually changed his machine to auto CPAP 6-10 cm H2O    He is dissatisfied with his nasal pillow mask as the exhalation ports in the front of the mask is releasing too much pressure  The patient will be sent for a mask refitting, and DME equipment with [employer]       Vitamin B12, MMA, and folate will be ordered with baseline labs  Plan:  - CPAP settings adjusted in office as noted  - Mask fitting, DME reorder orders placed  -patient should be established with [facility]  - Vitamin B12, B9, MMA ordered as baseline labs  - Patient to call office for any new symptoms, questions or concerns  - Follow-up in 1 year

## 2023-06-27 ENCOUNTER — APPOINTMENT (OUTPATIENT)
Dept: LAB | Facility: CLINIC | Age: 63
End: 2023-06-27
Payer: COMMERCIAL

## 2023-06-27 DIAGNOSIS — R79.89 LOW TESTOSTERONE IN MALE: ICD-10-CM

## 2023-06-27 DIAGNOSIS — G47.33 OBSTRUCTIVE SLEEP APNEA: ICD-10-CM

## 2023-06-27 LAB
BASOPHILS # BLD AUTO: 0.06 THOUSANDS/ÂΜL (ref 0–0.1)
BASOPHILS NFR BLD AUTO: 1 % (ref 0–1)
EOSINOPHIL # BLD AUTO: 0.26 THOUSAND/ÂΜL (ref 0–0.61)
EOSINOPHIL NFR BLD AUTO: 6 % (ref 0–6)
ERYTHROCYTE [DISTWIDTH] IN BLOOD BY AUTOMATED COUNT: 14.7 % (ref 11.6–15.1)
FOLATE SERPL-MCNC: >22.3 NG/ML
HCT VFR BLD AUTO: 48 % (ref 36.5–49.3)
HGB BLD-MCNC: 16.2 G/DL (ref 12–17)
IMM GRANULOCYTES # BLD AUTO: 0.02 THOUSAND/UL (ref 0–0.2)
IMM GRANULOCYTES NFR BLD AUTO: 0 % (ref 0–2)
LYMPHOCYTES # BLD AUTO: 1.15 THOUSANDS/ÂΜL (ref 0.6–4.47)
LYMPHOCYTES NFR BLD AUTO: 24 % (ref 14–44)
MCH RBC QN AUTO: 28.5 PG (ref 26.8–34.3)
MCHC RBC AUTO-ENTMCNC: 33.8 G/DL (ref 31.4–37.4)
MCV RBC AUTO: 85 FL (ref 82–98)
MONOCYTES # BLD AUTO: 0.84 THOUSAND/ÂΜL (ref 0.17–1.22)
MONOCYTES NFR BLD AUTO: 18 % (ref 4–12)
NEUTROPHILS # BLD AUTO: 2.42 THOUSANDS/ÂΜL (ref 1.85–7.62)
NEUTS SEG NFR BLD AUTO: 51 % (ref 43–75)
NRBC BLD AUTO-RTO: 0 /100 WBCS
PLATELET # BLD AUTO: 226 THOUSANDS/UL (ref 149–390)
PMV BLD AUTO: 9.8 FL (ref 8.9–12.7)
RBC # BLD AUTO: 5.68 MILLION/UL (ref 3.88–5.62)
VIT B12 SERPL-MCNC: 978 PG/ML (ref 180–914)
WBC # BLD AUTO: 4.75 THOUSAND/UL (ref 4.31–10.16)

## 2023-06-27 PROCEDURE — 84403 ASSAY OF TOTAL TESTOSTERONE: CPT

## 2023-06-27 PROCEDURE — 83918 ORGANIC ACIDS TOTAL QUANT: CPT

## 2023-06-27 PROCEDURE — 36415 COLL VENOUS BLD VENIPUNCTURE: CPT

## 2023-06-27 PROCEDURE — 82607 VITAMIN B-12: CPT

## 2023-06-27 PROCEDURE — 82746 ASSAY OF FOLIC ACID SERUM: CPT

## 2023-06-27 PROCEDURE — 85025 COMPLETE CBC W/AUTO DIFF WBC: CPT

## 2023-06-27 PROCEDURE — 84402 ASSAY OF FREE TESTOSTERONE: CPT

## 2023-06-29 LAB
TESTOST FREE SERPL-MCNC: 28 PG/ML (ref 6.6–18.1)
TESTOST SERPL-MCNC: >1500 NG/DL (ref 264–916)

## 2023-07-03 LAB — METHYLMALONATE SERPL-SCNC: 119 NMOL/L (ref 0–378)

## 2023-07-10 ENCOUNTER — TELEPHONE (OUTPATIENT)
Dept: PULMONOLOGY | Facility: CLINIC | Age: 63
End: 2023-07-10

## 2023-07-10 NOTE — TELEPHONE ENCOUNTER
I called to follow up with Mr. Soriano Picking on his Cpap supply order and Mask fitting. I do not see who his DME company is so I don't know this was completed. I left him a message to call me back if he is still having an issue and not received his supplies or correct mask. Please verify his DME company if he calls back or transfer call to me. Thank you.

## 2023-07-18 ENCOUNTER — TELEPHONE (OUTPATIENT)
Dept: PULMONOLOGY | Facility: CLINIC | Age: 63
End: 2023-07-18

## 2023-07-18 NOTE — TELEPHONE ENCOUNTER
[facility] called in stating they don't take the patient insurance for the supplies that Dr Nadeem Johnson put in the system

## 2023-07-26 NOTE — TELEPHONE ENCOUNTER
I have been trying to get a copy of his old Sleep Studies from [facility] [address] [facility]. [employer] company needs a copy in order to provide him with his Cpap supplies. I have spoken to Mr. Anupam Caballero and he is aware. I will update once I receive them.

## 2023-08-04 DIAGNOSIS — R79.89 LOW TESTOSTERONE IN MALE: Primary | ICD-10-CM

## 2023-08-07 NOTE — PROGRESS NOTES
Patient has [address]. Cpap supply order has to be Faxed to [facility] and then, they will send the order to [address] [place] [address] for them to supply him with his supplies. Patient is aware and will notify me if he does not hear from [address] [place] [address].

## 2023-08-11 ENCOUNTER — APPOINTMENT (OUTPATIENT)
Dept: LAB | Facility: CLINIC | Age: 63
End: 2023-08-11
Payer: COMMERCIAL

## 2023-08-11 DIAGNOSIS — R79.89 LOW TESTOSTERONE IN MALE: ICD-10-CM

## 2023-08-11 PROCEDURE — 84403 ASSAY OF TOTAL TESTOSTERONE: CPT

## 2023-08-11 PROCEDURE — 36415 COLL VENOUS BLD VENIPUNCTURE: CPT

## 2023-08-11 PROCEDURE — 84402 ASSAY OF FREE TESTOSTERONE: CPT

## 2023-08-14 DIAGNOSIS — E78.2 MIXED HYPERLIPIDEMIA: Primary | ICD-10-CM

## 2023-08-14 DIAGNOSIS — K21.9 GASTROESOPHAGEAL REFLUX DISEASE WITHOUT ESOPHAGITIS: ICD-10-CM

## 2023-08-14 LAB
TESTOST FREE SERPL-MCNC: 15.5 PG/ML (ref 6.6–18.1)
TESTOST SERPL-MCNC: 1038 NG/DL (ref 264–916)

## 2023-08-14 RX ORDER — OMEGA-3-ACID ETHYL ESTERS 1 G/1
2 CAPSULE, LIQUID FILLED ORAL 2 TIMES DAILY
Qty: 90 CAPSULE | Refills: 0 | Status: SHIPPED | OUTPATIENT
Start: 2023-08-14

## 2023-08-14 RX ORDER — OMEPRAZOLE 20 MG/1
20 CAPSULE, DELAYED RELEASE ORAL DAILY
Qty: 90 CAPSULE | Refills: 0 | Status: SHIPPED | OUTPATIENT
Start: 2023-08-14

## 2023-08-31 ENCOUNTER — TELEPHONE (OUTPATIENT)
Dept: FAMILY MEDICINE CLINIC | Facility: CLINIC | Age: 63
End: 2023-08-31

## 2023-08-31 NOTE — TELEPHONE ENCOUNTER
Spoke to patients wife and she explained patient and her moved to [place]. They will now be seeing a family care provider down there. They sent a medical records request. This request was sent to [facility]. Please update PCP field.

## 2023-09-01 NOTE — TELEPHONE ENCOUNTER
08/31/23 10:59 PM        The office's request has been received, reviewed, and the patient chart updated. The PCP has successfully been removed with a patient attribution note. This message will now be completed.         Thank you  Edith Zaldivar

## 2023-12-24 ENCOUNTER — TELEPHONE (OUTPATIENT)
Dept: PULMONOLOGY | Facility: CLINIC | Age: 63
End: 2023-12-24

## 2023-12-24 NOTE — TELEPHONE ENCOUNTER
Patient saw Dr. Collins in Pulmonary for Sleep. Please schedule patient for return in about 1 year (around 6/21/2024).    Thank you!